# Patient Record
Sex: FEMALE | Race: WHITE
[De-identification: names, ages, dates, MRNs, and addresses within clinical notes are randomized per-mention and may not be internally consistent; named-entity substitution may affect disease eponyms.]

---

## 2018-11-13 ENCOUNTER — HOSPITAL ENCOUNTER (EMERGENCY)
Dept: HOSPITAL 80 - FED | Age: 33
Discharge: HOME | End: 2018-11-13
Payer: COMMERCIAL

## 2018-11-13 VITALS — DIASTOLIC BLOOD PRESSURE: 76 MMHG | SYSTOLIC BLOOD PRESSURE: 129 MMHG

## 2018-11-13 DIAGNOSIS — R09.1: Primary | ICD-10-CM

## 2018-11-13 DIAGNOSIS — R11.0: ICD-10-CM

## 2018-11-13 DIAGNOSIS — F41.9: ICD-10-CM

## 2018-11-13 DIAGNOSIS — R53.83: ICD-10-CM

## 2018-11-13 DIAGNOSIS — E86.9: ICD-10-CM

## 2018-11-13 LAB — PLATELET # BLD: 313 10^3/UL (ref 150–400)

## 2018-11-13 NOTE — EDPHY
H & P


Stated Complaint: pt awakened today with sob and dizzyness


Time Seen by Provider: 11/13/18 09:13


HPI/ROS: 





CHIEF COMPLAINT:  Dyspnea, fatigue, nausea





HISTORY OF PRESENT ILLNESS:  The patient presents to the ED with multiple 

complaints that began after waking up today which include dyspnea, fatigue and 

nausea.  The patient reportedly recently had a long plane flight from [place].  

She denies any asymmetric calf pain or swelling.  She does endorse symptoms of 

slight pleurisy and dyspnea.  She also complains of nausea and presyncope.  She 

denies any abdominal pain, vomiting or diarrhea.  She does have a history of 

anxiety and has not had her usual medications this morning.  The patient denies 

any additional acute neurologic complaints.





REVIEW OF SYSTEMS:


A comprehensive 10 point review of systems is otherwise negative aside from 

elements mentioned in the history of present illness.


Source: Patient


Exam Limitations: No limitations





- Personal History


LMP (Females 10-55): 1-7 Days Ago


Current Tetanus Diphtheria and Acellular Pertussis (TDAP): Yes





- Medical/Surgical History


Hx Asthma: No


Hx Chronic Respiratory Disease: No


Hx Diabetes: No


Hx Cardiac Disease: No


Hx Renal Disease: No


Hx Cirrhosis: No


Hx Alcoholism: No


Hx HIV/AIDS: No


Hx Splenectomy or Spleen Trauma: No


Other PMH: Past medical history:  Anxiety





- Social History


Smoking Status: Never smoked





- Physical Exam


Exam: 





General Appearance:  Alert, no acute distress


Eyes:  Pupils equal and round no pallor or injection


ENT, Mouth:  Mucous membranes moist


Respiratory:  There are no retractions, lungs are clear to auscultation


Cardiovascular:  Regular rate and rhythm


Gastrointestinal:  Abdomen is soft and nontender, no masses, bowel sounds normal


Neurological:  5/5 strength all 4 extremities


Skin:  Warm and dry, no rashes


Musculoskeletal:  Neck is supple nontender


Extremities:  symmetrical, full range of motion








Constitutional: 


 Initial Vital Signs











Temperature (C)  37.1 C   11/13/18 09:02


 


Heart Rate  84   11/13/18 09:02


 


Respiratory Rate  20   11/13/18 09:02


 


Blood Pressure  108/75   11/13/18 09:02


 


O2 Sat (%)  97   11/13/18 09:02








 











O2 Delivery Mode               Room Air














Allergies/Adverse Reactions: 


 





No Known Allergies Allergy (Unverified 11/13/18 09:02)


 








Home Medications: 














 Medication  Instructions  Recorded


 


Trintellix  11/13/18


 


Wellbutrin 100mg (*)  11/13/18














Medical Decision Making





- Diagnostics


EKG Interpretation: 





EKG:  Complete interpretation has been separately recorded in the [facility] 

archive.  Summary impression:  Sinus rhythm, rate 76


Imaging Results: 


 Imaging Impressions





Chest/Thorax CTA  11/13/18 10:04


Impression:  There is no CT evidence for pulmonary artery thrombo-embolic 

disease.


 


Findings were discussed with Harsh Mazariegos MD at 10:53, on 11/13/2018.


 











ED Course/Re-evaluation: 





ED course:





The patient presents to the ED for evaluation of pleuritic chest pain in the 

setting of a recent long plane flight.  The patient has no risk factors for 

thromboembolic disease.  A screening D-dimer was abnormal.  A CT pulmonary 

angiogram failed to demonstrate any evidence of PE.  Additionally the patient 

has had some symptoms of fatigue and malaise.  She has no evidence of a 

critical anemia or significant metabolic derangement.  The patient did appear 

to be somewhat anxious on my initial exam.  She received 1 mg of IV Ativan as 

she had not had a regular medications for anxiety today.





The patient was monitored here in the emergency department without hypotension 

or arrhythmia.





At this point time I see no evidence of an obvious metabolic condition, cardiac 

condition or thromboembolic event.





I have encouraged the patient to increase her fluid hydration today as mild 

dehydration and anxiety may be contributing to her symptoms today.





The patient has been provided the contact information of our on-call primary 

care provider.





Re-examination at noon demonstrates no evidence acute abdomen.  She is 

hemodynamically stable without evidence of fever.  She is neurologically 

intact.  The patient will be discharged home with customary aftercare 

instructions and return precautions.


Differential Diagnosis: 





Differential diagnosis considered includes pulmonary embolism, anemia, 

dehydration, metabolic derangement, anxiety, arrhythmia





- Data Points


Laboratory Results: 


 Laboratory Results





 11/13/18 09:13 





 11/13/18 09:13 





 











  11/13/18 11/13/18 11/13/18





  09:13 09:13 09:13


 


WBC      24.64 10^3/uL H 10^3/uL





     (3.80-9.50) 


 


RBC      4.68 10^6/uL 10^6/uL





     (4.18-5.33) 


 


Hgb      14.0 g/dL g/dL





     (12.6-16.3) 


 


Hct      42.7 % %





     (38.0-47.0) 


 


MCV      91.2 fL fL





     (81.5-99.8) 


 


MCH      29.9 pg pg





     (27.9-34.1) 


 


MCHC      32.8 g/dL g/dL





     (32.4-36.7) 


 


RDW      12.0 % %





     (11.5-15.2) 


 


Plt Count      313 10^3/uL 10^3/uL





     (150-400) 


 


MPV      10.0 fL fL





     (8.7-11.7) 


 


Neut % (Auto)      Not Reported 





    


 


Lymph % (Auto)      Not Reported 





    


 


Mono % (Auto)      Not Reported 





    


 


Eos % (Auto)      Not Reported 





    


 


Baso % (Auto)      Not Reported 





    


 


Nucleat RBC Rel Count      Not Reported 





    


 


Absolute Neuts (auto)      Not Reported 





    


 


Absolute Lymphs (auto)      Not Reported 





    


 


Absolute Monos (auto)      Not Reported 





    


 


Absolute Eos (auto)      Not Reported 





    


 


Absolute Basos (auto)      Not Reported 





    


 


Absolute Nucleated RBC      Not Reported 





    


 


Immature Gran %      Not Reported 





    


 


Seg Neutrophils %      89.0 % %





    


 


Band Neutrophils %      0.0 % %





    


 


Lymphocytes %      7.0 % %





    


 


Monocytes %      4.0 % %





    


 


Eosinophils %      0.0 % %





    


 


Basophils %      0.0 % %





    


 


Metamyelocytes %      0.0 % %





    


 


Myelocytes %      0.0 % %





    


 


Promyelocytes %      0.0 % %





    


 


Blast Cells %      0.0 % %





    


 


Immature Gran #      Not Reported 





    


 


Absolute Seg Neuts      21.93 10^3/uL H 10^3/uL





     (1.70-6.50) 


 


Absolute Band Neuts      0.00 10^3/uL 10^3/uL





     (0.00-0.70) 


 


Absolute Lymphocytes      1.72 10^3/uL 10^3/uL





     (1.00-3.00) 


 


Absolute Monocytes      0.99 10^3/uL H 10^3/uL





     (0.30-0.80) 


 


Absolute Eosinophils      0.00 10^3/uL L 10^3/uL





     (0.03-0.40) 


 


Absolute Basophils      0.00 10^3/uL L 10^3/uL





     (0.02-0.10) 


 


Absolute Metamyelocyte      0.00 10^3/mL 10^3/mL





     (0.00-0.00) 


 


Absolute Myelocytes      0.00 10^3/mL 10^3/mL





     (0.00-0.00) 


 


Absolute Promyelocytes      0.00 10^3/uL 10^3/uL





     (0.00-0.00) 


 


Absolute Plasma Cells      0.00 10^3/uL 10^3/uL





     (0.00-0.00) 


 


Nucleated RBCs      0 /100 WBC /100 WBC





     (0-0) 


 


Absolute Blast Cells      0.00 10^3/uL 10^3/uL





     (0.00-0.00) 


 


Plasma Cells %      0.0 % %





    


 


Platelet Estimate      ADEQUATE 





     (ADEQ) 


 


D-Dimer  0.62 ug/mLFEU H ug/mLFEU    





   (0.00-0.50)   


 


Sodium    141 mEq/L mEq/L  





    (135-145)  


 


Potassium    3.9 mEq/L mEq/L  





    (3.3-5.0)  


 


Chloride    102 mEq/L mEq/L  





    ()  


 


Carbon Dioxide    27 mEq/l mEq/l  





    (22-31)  


 


Anion Gap    12 mEq/L mEq/L  





    (6-14)  


 


BUN    10 mg/dL mg/dL  





    (7-23)  


 


Creatinine    0.8 mg/dL mg/dL  





    (0.6-1.0)  


 


Estimated GFR    > 60   





    


 


Glucose    105 mg/dL H mg/dL  





    ()  


 


Calcium    9.6 mg/dL mg/dL  





    (8.5-10.4)  


 


Troponin I    < 0.012 ng/mL ng/mL  





    (0.000-0.034)  











Medications Given: 


 








Discontinued Medications





Sodium Chloride (Ns)  1,000 mls @ 0 mls/hr IV ONCE ONE; Wide Open


   PRN Reason: Protocol


   Stop: 11/13/18 09:44


   Last Admin: 11/13/18 09:52 Dose:  1,000 mls


Lorazepam (Ativan Injection)  1 mg IVP EDNOW ONE


   Stop: 11/13/18 09:45


   Last Admin: 11/13/18 09:52 Dose:  1 mg








Departure





- Departure


Disposition: Home, Routine, Self-Care


Clinical Impression: 


 Pleurisy, Dehydration





Condition: Good


Instructions:  Pleurisy (ED)


Additional Instructions: 


1.  Your EKG, CT scan and laboratory testing are normal.


2. Take Ibuprofen or Motrin 600 mg by mouth three times a day.


3. Please try and increase your fluid intake today as mild dehydration may have 

contributed to your symptoms today.


4. You have been given the contact number of our on-call primary care provider.


5. Please return to the ED immediately for markedly worsening symptoms, high 

fever, abdominal pain or other concerns.











  


Referrals: 


Anat Gabriel MD [[facility] [place] [facility]] - As per Instructions

## 2018-11-13 NOTE — CPEKG
Test Reason : OPEN

Blood Pressure : ***/*** mmHG

Vent. Rate : 076 BPM     Atrial Rate : 078 BPM

   P-R Int : 165 ms          QRS Dur : 088 ms

    QT Int : 373 ms       P-R-T Axes : 046 053 029 degrees

   QTc Int : 420 ms

 

Sinus rhythm

 

Confirmed by Harsh Mazariegos ([id number]) on 11/13/2018 9:51:51 AM

 

Referred By:             Confirmed By:Harsh Mazariegos